# Patient Record
(demographics unavailable — no encounter records)

---

## 2024-10-18 NOTE — HISTORY OF PRESENT ILLNESS
[FreeTextEntry1] : Interval History: 5/14 phone triage: unable to procure Asmanex so budesonide as standby. Phone triage 6/27/24: -croup cough x 2 days, no  fevers, no distress -had a coughing fit last night that was not quickly relieved by Albuterol nebulizer -no congestion, but using Flonase -never started Budesonide after conversation May since they were supposed to wean off ICS (Asmanex) that month -still can not find Asmanex in pharmacies- offered [facility] which has Asmanex in stock, but mom denied since she thinks insurance will only cover 90 day supply- told her to look into this anyway -advised mother to continue Albuterol inhaler Q4 (complaints of rash to chin and thinks that nebulizer is causing this), continue 5 days of Prednisolone as per Dr. Greco's d/c instructions and seek the guidance of PMD to see if 5 day course is necessary -advised to start Budesonide 0.5mg 1 vial BID for about a week or until better as per last call in May -has a working nebulizer at home  Had a good summer in terms of being asymptomatic.  Was off inhaled steroids by July.  GOod exeercise tolerance. NO sleep disturbances.  No nasal symptoms.  Last seen 5/7/24: Almost 4 yo female with chronic cough and episodes of croup. I discussed the unified airway theory: upper airway reactivity manifesting as croup and lower airway reactivity with asthma. I believe these have an atopic basis and that the nocturnal cough may well relate to postnasal drip. CXR and airway fluoroscopy have been normal. Allergy consult done in March 2023 and testing was positive for mold allergy. MItigating measures done even as family had no concerns re. molds in the home; no humidifier use moving forward.  Singulair was prescribed in January 2023 as part or step up care. However due to concern re. behavioral "side effect" this was not started.. No concerns re. sleep disordered breathing. She has postnasal drip currently controlled with Flonase.  She had been doing well on intermittent regimen with Flovent until the fall of 2023 with onset of school. she has been on daily inhaled steroids in the form of Asmanex (covered by insurance). She is doing well to include control of allergic rhinitis with Flonase. I will plan to give her a break off inhaled steroids in the summertime, discuss intermittent therapy which will inform decision to go back to daily use perhaps by next fall/schoolyear.  Recommendations: 1. Asmanex 100 mcg, 2 puffs two times a day (always use spacer with asthma pumps and rinse mouth after use); if doing well can go down to 2 puffs once a day by end of May. If continuing to do well stop by mid-June. Moving forward, when sick with a cold and with cough or wheeze, start at 2 puffs 2 times a day for about a week or until better then stop. Keep track of use. 2. Indications for albuterol were reviewed. 3. Indications for prednisolone reviewed and stated in patient instructions; grandma had asked for standby prednisolone at the previous visit. 4. Flonase at bedtime for springtime allergies. 5. Follow up the fall.

## 2024-10-18 NOTE — ASSESSMENT
[FreeTextEntry1] : 4 yo female with chronic cough and episodes of croup. I discussed the unified airway theory: upper airway reactivity manifesting as croup and lower airway reactivity with asthma. I believe these have an atopic basis and that the nocturnal cough may well relate to postnasal drip. CXR and airway fluoroscopy have been normal. Allergy consult done in March 2023 and testing was positive for mold allergy. MItigating measures done even as family had no concerns re. molds in the home.  Singulair was prescribed in January 2023 as part or step up care. However due to concern re. behavioral "side effect" this was not started.. No concerns re. sleep disordered breathing.   She was off inhaled steroids in the summer having been on Asmanex in the previous fall and winter seasons.  SHe is doing well thus far going now into October.  Grandmother is very receptive to intermittent regimen such that frequent use will inform the decision to go back to daily use,  The family has had difficulty procuring Asmanex and had used nebulized budesonide. I have prescribed fluticasone propionate in lieu to be administered via MDI/spacer.  Recommendations: 1. When sick with a cold and with cough or wheeze, start fluticasone propionate 44 ucg at 2 puffs 2 times a day for about a week or until better then stop. Keep track of use. If not (yet) available, use nebulized budesonide 0.5 mg BID with same instructions. 2. Indications for albuterol were reviewed. 3. Nebulized saline 2-3 times a day for nose or chest congestion. 4. Follow up in Jan. 2025.

## 2024-10-18 NOTE — PHYSICAL EXAM
[Well Nourished] : well nourished [Well Developed] : well developed [Alert] : ~L alert [Active] : active [Normal Breathing Pattern] : normal breathing pattern [No Respiratory Distress] : no respiratory distress [No Allergic Shiners] : no allergic shiners [No Drainage] : no drainage [No Conjunctivitis] : no conjunctivitis [Nasal Mucosa Non-Edematous] : nasal mucosa non-edematous [No Nasal Drainage] : no nasal drainage [No Polyps] : no polyps [No Sinus Tenderness] : no sinus tenderness [No Oral Pallor] : no oral pallor [No Oral Cyanosis] : no oral cyanosis [Non-Erythematous] : non-erythematous [No Exudates] : no exudates [No Postnasal Drip] : no postnasal drip [No Tonsillar Enlargement] : no tonsillar enlargement [Absence Of Retractions] : absence of retractions [Symmetric] : symmetric [Good Expansion] : good expansion [No Acc Muscle Use] : no accessory muscle use [Good aeration to bases] : good aeration to bases [Equal Breath Sounds] : equal breath sounds bilaterally [No Crackles] : no crackles [No Rhonchi] : no rhonchi [No Wheezing] : no wheezing [Normal Sinus Rhythm] : normal sinus rhythm [No Heart Murmur] : no heart murmur [Soft, Non-Tender] : soft, non-tender [No Hepatosplenomegaly] : no hepatosplenomegaly [Non Distended] : was not ~L distended [Abdomen Mass (___ Cm)] : no abdominal mass palpated [Full ROM] : full range of motion [No Clubbing] : no clubbing [Capillary Refill < 2 secs] : capillary refill less than two seconds [No Cyanosis] : no cyanosis [No Petechiae] : no petechiae [No Kyphoscoliosis] : no kyphoscoliosis [No Contractures] : no contractures [Alert and  Oriented] : alert and oriented [No Abnormal Focal Findings] : no abnormal focal findings [Normal Muscle Tone And Reflexes] : normal muscle tone and reflexes [No Rashes] : no rashes [No Skin Lesions] : no skin lesions [FreeTextEntry3] : not done

## 2024-10-18 NOTE — HISTORY OF PRESENT ILLNESS
[FreeTextEntry1] : Interval History: 5/14 phone triage: unable to procure Asmanex so budesonide as standby. Phone triage 6/27/24: -croup cough x 2 days, no  fevers, no distress -had a coughing fit last night that was not quickly relieved by Albuterol nebulizer -no congestion, but using Flonase -never started Budesonide after conversation May since they were supposed to wean off ICS (Asmanex) that month -still can not find Asmanex in pharmacies- offered [facility] which has Asmanex in stock, but mom denied since she thinks insurance will only cover 90 day supply- told her to look into this anyway -advised mother to continue Albuterol inhaler Q4 (complaints of rash to chin and thinks that nebulizer is causing this), continue 5 days of Prednisolone as per Dr. Greco's d/c instructions and seek the guidance of PMD to see if 5 day course is necessary -advised to start Budesonide 0.5mg 1 vial BID for about a week or until better as per last call in May -has a working nebulizer at home  Had a good summer in terms of being asymptomatic.  Was off inhaled steroids by July.  GOod exeercise tolerance. NO sleep disturbances.  No nasal symptoms.  Last seen 5/7/24: Almost 6 yo female with chronic cough and episodes of croup. I discussed the unified airway theory: upper airway reactivity manifesting as croup and lower airway reactivity with asthma. I believe these have an atopic basis and that the nocturnal cough may well relate to postnasal drip. CXR and airway fluoroscopy have been normal. Allergy consult done in March 2023 and testing was positive for mold allergy. MItigating measures done even as family had no concerns re. molds in the home; no humidifier use moving forward.  Singulair was prescribed in January 2023 as part or step up care. However due to concern re. behavioral "side effect" this was not started.. No concerns re. sleep disordered breathing. She has postnasal drip currently controlled with Flonase.  She had been doing well on intermittent regimen with Flovent until the fall of 2023 with onset of school. she has been on daily inhaled steroids in the form of Asmanex (covered by insurance). She is doing well to include control of allergic rhinitis with Flonase. I will plan to give her a break off inhaled steroids in the summertime, discuss intermittent therapy which will inform decision to go back to daily use perhaps by next fall/schoolyear.  Recommendations: 1. Asmanex 100 mcg, 2 puffs two times a day (always use spacer with asthma pumps and rinse mouth after use); if doing well can go down to 2 puffs once a day by end of May. If continuing to do well stop by mid-June. Moving forward, when sick with a cold and with cough or wheeze, start at 2 puffs 2 times a day for about a week or until better then stop. Keep track of use. 2. Indications for albuterol were reviewed. 3. Indications for prednisolone reviewed and stated in patient instructions; grandma had asked for standby prednisolone at the previous visit. 4. Flonase at bedtime for springtime allergies. 5. Follow up the fall.

## 2025-01-14 NOTE — ASSESSMENT
[FreeTextEntry1] : 6 yo female with chronic cough and episodes of croup. I discussed the unified airway theory: upper airway reactivity manifesting as croup and lower airway reactivity with asthma. I believe these have an atopic basis and that the nocturnal cough may well relate to postnasal drip. CXR and airway fluoroscopy have been normal. Allergy consult done in March 2023 and testing was positive for mold allergy. MItigating measures done even as family had no concerns re. molds in the home.  Singulair was prescribed in January 2023 as part or step up care. However due to concern re. behavioral "side effect" this was not started.. No concerns re. sleep disordered breathing.  She was off inhaled steroids in the summer having been on Asmanex in the previous fall and winter seasons. SHe is doing well thus far on intermittent regimen this winter. She is on Flonase for nasal congestion.   Recommendations: 1. When sick with a cold and with cough or wheeze, start fluticasone propionate 44 ucg at 2 puffs 2 times a day until better then stop. Keep track of use. If not (yet) available, use nebulized budesonide 0.5 mg BID with same instructions. 2. Indications for albuterol were reviewed. 3. Nebulized saline 2-3 times a day for nose or chest congestion. 4. Flonase for nasal congestion. 4. Follow up in May or June.

## 2025-01-14 NOTE — HISTORY OF PRESENT ILLNESS
[FreeTextEntry1] : Padmini is doing well, here today with her grandmother. Breathing well without pain. Sometimes coughs with playing. Sleeps well. No cough in the morning. No recent cold symptoms. Eating well. Breathes through nose well. No stuffy nose. Takes Flonase at night only. Has not used inhaled steroids or albuterol since October. Older brother, Saul, (8 years old) was very sick with Mycoplasma pneumonia, has an upcoming appointment with Dr. Greco in February.   last seen 10/18/24: 4 yo female with chronic cough and episodes of croup. I discussed the unified airway theory: upper airway reactivity manifesting as croup and lower airway reactivity with asthma. I believe these have an atopic basis and that the nocturnal cough may well relate to postnasal drip. CXR and airway fluoroscopy have been normal. Allergy consult done in March 2023 and testing was positive for mold allergy. MItigating measures done even as family had no concerns re. molds in the home.  Singulair was prescribed in January 2023 as part or step up care. However due to concern re. behavioral "side effect" this was not started.. No concerns re. sleep disordered breathing.  She was off inhaled steroids in the summer having been on Asmanex in the previous fall and winter seasons. SHe is doing well thus far going now into October. Grandmother is very receptive to intermittent regimen such that frequent use will inform the decision to go back to daily use, The family has had difficulty procuring Asmanex and had used nebulized budesonide. I have prescribed fluticasone propionate in lieu to be administered via MDI/spacer.  Recommendations: 1. When sick with a cold and with cough or wheeze, start fluticasone propionate 44 ucg at 2 puffs 2 times a day for about a week or until better then stop. Keep track of use. If not (yet) available, use nebulized budesonide 0.5 mg BID with same instructions. 2. Indications for albuterol were reviewed. 3. Nebulized saline 2-3 times a day for nose or chest congestion. 4. Follow up in Jan. 2025.

## 2025-01-14 NOTE — ASSESSMENT
[FreeTextEntry1] : 4 yo female with chronic cough and episodes of croup. I discussed the unified airway theory: upper airway reactivity manifesting as croup and lower airway reactivity with asthma. I believe these have an atopic basis and that the nocturnal cough may well relate to postnasal drip. CXR and airway fluoroscopy have been normal. Allergy consult done in March 2023 and testing was positive for mold allergy. MItigating measures done even as family had no concerns re. molds in the home.  Singulair was prescribed in January 2023 as part or step up care. However due to concern re. behavioral "side effect" this was not started.. No concerns re. sleep disordered breathing.  She was off inhaled steroids in the summer having been on Asmanex in the previous fall and winter seasons. SHe is doing well thus far on intermittent regimen this winter. She is on Flonase for nasal congestion.   Recommendations: 1. When sick with a cold and with cough or wheeze, start fluticasone propionate 44 ucg at 2 puffs 2 times a day until better then stop. Keep track of use. If not (yet) available, use nebulized budesonide 0.5 mg BID with same instructions. 2. Indications for albuterol were reviewed. 3. Nebulized saline 2-3 times a day for nose or chest congestion. 4. Flonase for nasal congestion. 4. Follow up in May or June.

## 2025-01-14 NOTE — PHYSICAL EXAM
[Well Nourished] : well nourished [Well Developed] : well developed [Alert] : ~L alert [Active] : active [Normal Breathing Pattern] : normal breathing pattern [No Respiratory Distress] : no respiratory distress [No Allergic Shiners] : no allergic shiners [No Drainage] : no drainage [No Conjunctivitis] : no conjunctivitis [Nasal Mucosa Non-Edematous] : nasal mucosa non-edematous [No Nasal Drainage] : no nasal drainage [No Sinus Tenderness] : no sinus tenderness [No Oral Pallor] : no oral pallor [No Oral Cyanosis] : no oral cyanosis [Non-Erythematous] : non-erythematous [No Exudates] : no exudates [No Postnasal Drip] : no postnasal drip [No Tonsillar Enlargement] : no tonsillar enlargement [Absence Of Retractions] : absence of retractions [Symmetric] : symmetric [Good Expansion] : good expansion [No Acc Muscle Use] : no accessory muscle use [Good aeration to bases] : good aeration to bases [Equal Breath Sounds] : equal breath sounds bilaterally [No Crackles] : no crackles [No Rhonchi] : no rhonchi [No Wheezing] : no wheezing [Normal Sinus Rhythm] : normal sinus rhythm [No Heart Murmur] : no heart murmur [Soft, Non-Tender] : soft, non-tender [No Hepatosplenomegaly] : no hepatosplenomegaly [Non Distended] : was not ~L distended [Abdomen Mass (___ Cm)] : no abdominal mass palpated [Full ROM] : full range of motion [No Clubbing] : no clubbing [Capillary Refill < 2 secs] : capillary refill less than two seconds [No Cyanosis] : no cyanosis [No Petechiae] : no petechiae [No Kyphoscoliosis] : no kyphoscoliosis [No Contractures] : no contractures [Alert and  Oriented] : alert and oriented [No Abnormal Focal Findings] : no abnormal focal findings [Normal Muscle Tone And Reflexes] : normal muscle tone and reflexes [No Rashes] : no rashes [No Skin Lesions] : no skin lesions [FreeTextEntry3] : not done

## 2025-06-24 NOTE — HISTORY OF PRESENT ILLNESS
[FreeTextEntry1] : Interval Hx: Had used ICS and albuterol x 1 week in April - was sick at the time that it was also raining hard per grandma. she recovered uneventfully. At that time also received oral steroids x 3 days. Used Flonase x 2 weeks for nasal allergies following this event. No snoring. No complains of springtime allergies. Has good exercise tolerance.  last seen 1/14/25:4 yo female with chronic cough and episodes of croup. I discussed the unified airway theory: upper airway reactivity manifesting as croup and lower airway reactivity with asthma. I believe these have an atopic basis and that the nocturnal cough may well relate to postnasal drip. CXR and airway fluoroscopy have been normal. Allergy consult done in March 2023 and testing was positive for mold allergy. MItigating measures done even as family had no concerns re. molds in the home.  Singulair was prescribed in January 2023 as part or step up care. However due to concern re. behavioral "side effect" this was not started.. No concerns re. sleep disordered breathing.  She was off inhaled steroids in the summer having been on Asmanex in the previous fall and winter seasons. SHe is doing well thus far on intermittent regimen this winter. She is on Flonase for nasal congestion.  Recommendations: 1. When sick with a cold and with cough or wheeze, start fluticasone propionate 44 ucg at 2 puffs 2 times a day until better then stop. Keep track of use. If not (yet) available, use nebulized budesonide 0.5 mg BID with same instructions. 2. Indications for albuterol were reviewed. 3. Nebulized saline 2-3 times a day for nose or chest congestion. 4. Flonase for nasal congestion. 4. Follow up in May or June.

## 2025-06-24 NOTE — PHYSICAL EXAM
[Well Nourished] : well nourished [Well Developed] : well developed [Alert] : ~L alert [Active] : active [Normal Breathing Pattern] : normal breathing pattern [No Respiratory Distress] : no respiratory distress [No Allergic Shiners] : no allergic shiners [No Drainage] : no drainage [No Conjunctivitis] : no conjunctivitis [Tympanic Membranes Clear] : tympanic membranes were clear [Nasal Mucosa Non-Edematous] : nasal mucosa non-edematous [No Nasal Drainage] : no nasal drainage [No Sinus Tenderness] : no sinus tenderness [No Oral Pallor] : no oral pallor [No Oral Cyanosis] : no oral cyanosis [Non-Erythematous] : non-erythematous [No Exudates] : no exudates [No Postnasal Drip] : no postnasal drip [No Tonsillar Enlargement] : no tonsillar enlargement [Absence Of Retractions] : absence of retractions [Symmetric] : symmetric [Good Expansion] : good expansion [No Acc Muscle Use] : no accessory muscle use [Good aeration to bases] : good aeration to bases [Equal Breath Sounds] : equal breath sounds bilaterally [No Crackles] : no crackles [No Rhonchi] : no rhonchi [No Wheezing] : no wheezing [Normal Sinus Rhythm] : normal sinus rhythm [No Heart Murmur] : no heart murmur [Soft, Non-Tender] : soft, non-tender [No Hepatosplenomegaly] : no hepatosplenomegaly [Non Distended] : was not ~L distended [Abdomen Mass (___ Cm)] : no abdominal mass palpated [Full ROM] : full range of motion [No Clubbing] : no clubbing [Capillary Refill < 2 secs] : capillary refill less than two seconds [No Cyanosis] : no cyanosis [No Petechiae] : no petechiae [No Kyphoscoliosis] : no kyphoscoliosis [No Contractures] : no contractures [Alert and  Oriented] : alert and oriented [No Abnormal Focal Findings] : no abnormal focal findings [Normal Muscle Tone And Reflexes] : normal muscle tone and reflexes [No Birth Marks] : no birth marks [No Rashes] : no rashes [No Skin Lesions] : no skin lesions

## 2025-06-24 NOTE — ASSESSMENT
[FreeTextEntry1] : 5 yo female with chronic cough and episodes of croup. I discussed the unified airway theory: upper airway reactivity manifesting as croup and lower airway reactivity with asthma. I believe these have an atopic basis and that the nocturnal cough may well relate to postnasal drip. CXR and airway fluoroscopy have been normal. Allergy consult done in March 2023 and testing was positive for mold allergy. MItigating measures done even as family had no concerns re. molds in the home.  Singulair was prescribed in January 2023 as part or step up care. However due to concern re. behavioral "side effect" this was not started.. No concerns re. sleep disordered breathing.  SHe is doing well thus far on intermittent regimen with ICS and which will be continued through until next follow up.  I reminded grandma that frequency of use will inform the decision re. need for daily use. She had used the regimen once in April but at that time also needed oral steroids. She had recovered uneventfully from this event. She had also used Flonase for 2 weeks for springtime allergies that may have been a factor for the exacerbation.  Spirometry was normal. THis was her first attempt.  Recommendations: 1. When sick with a cold and with cough or wheeze, start fluticasone propionate 44 ucg at 2 puffs 2 times a day until better then stop. Keep track of use. If not (yet) available, use nebulized budesonide 0.5 mg BID with same instructions. 2. Indications for albuterol were reviewed. 3. Nebulized saline 2-3 times a day for nose or chest congestion. 4. Flonase for nasal congestion. 5. Indications for oral steroids reviewed. 6.  Follow up in Oct. 2025.   AT least 45 minutes were spent reviewing medical records, conducting this encounter, discussing plans of care in a face to face encounter with patient and grandma. Details of spirometry were reviewed and all questions were answered.